# Patient Record
Sex: FEMALE | Race: WHITE | Employment: FULL TIME | URBAN - NONMETROPOLITAN AREA
[De-identification: names, ages, dates, MRNs, and addresses within clinical notes are randomized per-mention and may not be internally consistent; named-entity substitution may affect disease eponyms.]

---

## 2019-08-05 ENCOUNTER — INITIAL CONSULT (OUTPATIENT)
Dept: PULMONOLOGY | Age: 29
End: 2019-08-05
Payer: COMMERCIAL

## 2019-08-05 VITALS
BODY MASS INDEX: 25.4 KG/M2 | WEIGHT: 138 LBS | SYSTOLIC BLOOD PRESSURE: 100 MMHG | HEART RATE: 78 BPM | DIASTOLIC BLOOD PRESSURE: 72 MMHG | OXYGEN SATURATION: 100 % | HEIGHT: 62 IN | RESPIRATION RATE: 16 BRPM

## 2019-08-05 DIAGNOSIS — G47.10 HYPERSOMNIA: Primary | ICD-10-CM

## 2019-08-05 DIAGNOSIS — R44.2 HALLUCINATION, HYPNOPOMPIC: ICD-10-CM

## 2019-08-05 DIAGNOSIS — F32.A DEPRESSION, UNSPECIFIED DEPRESSION TYPE: ICD-10-CM

## 2019-08-05 PROCEDURE — 99204 OFFICE O/P NEW MOD 45 MIN: CPT | Performed by: INTERNAL MEDICINE

## 2019-08-05 RX ORDER — FLUOXETINE HYDROCHLORIDE 40 MG/1
40 CAPSULE ORAL DAILY
COMMUNITY

## 2019-08-05 NOTE — PROGRESS NOTES
Chief Complaint:  Renata Tavarez is a new sleep patient. Daytime sleepiness and non restorative sleep. Mallampati airway Class:  IV  Neck Circumference:  13 Inches    [place] sleepiness score 8/5/19:  11  SAQLI 55.

## 2019-09-28 NOTE — PROGRESS NOTES
Pt is a 43 yo F BIBEMS for suicidal attempt.  EMS reports patient was being transferred to [facility] when she got there she escaped staff and ran into the forest and found a piece of glass and cut her neck and R leg.  unknown last tetanus. no other complaints. and gasping sensation at night time: No.  History of headaches in the morning:No.  History of dry mouth in the morning: No.  History of palpitations during night time/nocturnal awakenings: No.  History of sweating during night time/nocturnal awakenings: No    General:  History of head injury in the past: No.    History of seizures: No.   Rest less legs syndrome symptoms:NO  History suggestive of periodic limb movements during sleep: NO  History suggestive of hypnagogic hallucinations: Yes  History suggestive of hypnopompic hallucinations: Yes. History suggestive of sleep talking:NO  History suggestive of sleep walking:Yes- when she was a child. No recent episodes of sleep walking. History suggestive of bruxism: Yes. [x] Uses mouth guard. History suggestive of cataplexy: NO   History suggestive of sleep paralysis: NO     Family history of sleep disorders:  Family history of obstructive sleep apnea: Yes. Family member diagnosed with obstructive sleep apnea father. Family member using PAP therapy:  Yes. Family history of Narcolepsy: No.   Family history of Rest less legs syndrome : No.     History regarding old sleep studies:  Prior history of sleep study: No.  Using CPAP device: No.  Currently using home Oxygen: NO.      Patient considerations:  Is the patient is ambulatory: Yes  Patient is currently using: None of these Wheelchair, [address] or [employer]. Para/Quadriplegic: NO  Hearing deficit : NO  Claustrophobic: NO  MDD : NO  Blind: NO  Incontinent: NO  Para/Quadraplegi: NO.   Need transportation to and from Sleep Center:NO      Social History:  Social History     Tobacco Use    Smoking status: Never Smoker    Smokeless tobacco: Never Used   Substance Use Topics    Alcohol use: Not on file    Drug use: No   .  She is currently working: Yes.      She is currently working as a dental hygienist during daytime  She usually goes to her work at:  8:00AM.   She completes her work at:  5:00PM. of suicidal ideas. She verbalizes understanding.  -I had a discussion with patient regarding avialable treatment options for her sleep disorder breathing including but not limited to CPAP titration in the sleep lab Vs.Dental appliance placement with referral to a local dentist Vs other available surgical options including Uvulopalatopharyngoplasty, maxillomandibular ostomy and tracheostomy as last option. At the end of discussion, she is not decided on her treatment if she found to have obstructive sleep apnea at this time.  -We will see Florentin Rojas back in 1week after the sleep study to go over the sleep study results and further management options.  -She was educated about sleep restriction therapy and advised to practice to improve her insomnia. -She was educated about stimulus control therapy and advise to practice to improve her insomnia. -She was educated to practice good sleep hygiene practices. She  was provided with a good sleep hygiene hand out.  -Parker Farah was advised to make earlier appointment with my clinic if she develops any worsening of sleep symptoms. She verbalizes understanding.  -Parker Farah was advised to not to drive any motor vehicles or operate heavy equipment until her sleep symptoms are under good control. Florentin Rojas verbalizes understanding.  - Florentin Rojas was educated about my impression and plan. She verbalizes understanding.

## 2020-05-04 ENCOUNTER — HOSPITAL ENCOUNTER (OUTPATIENT)
Dept: WOMENS IMAGING | Age: 30
Discharge: HOME | End: 2020-05-04
Payer: COMMERCIAL

## 2020-05-04 PROCEDURE — 76642 ULTRASOUND BREAST LIMITED: CPT

## 2020-05-04 PROCEDURE — G0279 TOMOSYNTHESIS, MAMMO: HCPCS

## 2020-05-11 ENCOUNTER — HOSPITAL ENCOUNTER (OUTPATIENT)
Dept: WOMENS IMAGING | Age: 30
Discharge: HOME | End: 2020-05-11
Payer: COMMERCIAL

## 2020-05-11 PROCEDURE — A4648 IMPLANTABLE TISSUE MARKER: HCPCS

## 2020-05-11 PROCEDURE — C1894 INTRO/SHEATH, NON-LASER: HCPCS

## 2020-05-11 PROCEDURE — 19083 BX BREAST 1ST LESION US IMAG: CPT

## 2020-05-11 PROCEDURE — 88305 TISSUE EXAM BY PATHOLOGIST: CPT

## 2020-05-11 PROCEDURE — 77065 DX MAMMO INCL CAD UNI: CPT

## 2020-05-11 PROCEDURE — 2709999900 HC NON-CHARGEABLE SUPPLY

## 2020-05-11 NOTE — PROGRESS NOTES
Formulation and discussion of sedation / procedure plans, risks, benefits, side effects and alternatives with patient and/or responsible adult completed. Electronically signed by Sung Moore MD on 5/11/2020 at 8:02 AM     The patient was counseled at length about the risks of zarina Covid-19 during their perioperative period and any recovery window from their procedure. The patient was made aware that zarina Covid-19  may worsen their prognosis for recovering from their procedure  and lend to a higher morbidity and/or mortality risk. All material risks, benefits, and reasonable alternatives including postponing the procedure were discussed. The patient does wish to proceed with the procedure at this time.

## 2020-05-12 ENCOUNTER — CLINICAL DOCUMENTATION (OUTPATIENT)
Dept: WOMENS IMAGING | Age: 30
End: 2020-05-12

## 2020-05-12 NOTE — PROGRESS NOTES
Laron Brianna Female  26 y.o., 1990 Int/Lang:   No / English DLA: [id number],            Benign breast results called to patient after discussing with Dr. Shoshana Acosta 6 month follow-up explained to the patient   Site status: patient states site is sore, she hasn't taken bandage off yet but will do when she gets home from work.